# Patient Record
Sex: FEMALE | Race: OTHER | URBAN - METROPOLITAN AREA
[De-identification: names, ages, dates, MRNs, and addresses within clinical notes are randomized per-mention and may not be internally consistent; named-entity substitution may affect disease eponyms.]

---

## 2024-04-08 ENCOUNTER — NURSE ONLY (OUTPATIENT)
Dept: INTERNAL MEDICINE CLINIC | Facility: HOSPITAL | Age: 44
End: 2024-04-08
Attending: EMERGENCY MEDICINE

## 2024-04-08 DIAGNOSIS — Z00.00 WELLNESS EXAMINATION: Primary | ICD-10-CM

## 2024-04-08 LAB
MEV IGG SER-ACNC: 11.7 AU/ML (ref 16.5–?)
MUV IGG SER IA-ACNC: 8.2 AU/ML (ref 11–?)
RUBV IGG SER QL: POSITIVE
RUBV IGG SER-ACNC: 26.7 IU/ML (ref 10–?)
VZV IGG SER IA-ACNC: 1872 (ref 165–?)

## 2024-04-08 PROCEDURE — 86480 TB TEST CELL IMMUN MEASURE: CPT

## 2024-04-08 PROCEDURE — 86765 RUBEOLA ANTIBODY: CPT

## 2024-04-08 PROCEDURE — 86735 MUMPS ANTIBODY: CPT

## 2024-04-08 PROCEDURE — 86787 VARICELLA-ZOSTER ANTIBODY: CPT

## 2024-04-08 PROCEDURE — 86762 RUBELLA ANTIBODY: CPT

## 2024-04-09 LAB
M TB IFN-G CD4+ T-CELLS BLD-ACNC: 0.05 IU/ML
M TB TUBERC IFN-G BLD QL: NEGATIVE
M TB TUBERC IGNF/MITOGEN IGNF CONTROL: >10 IU/ML
QFT TB1 AG MINUS NIL: 0.01 IU/ML
QFT TB2 AG MINUS NIL: 0.06 IU/ML

## 2025-03-24 ENCOUNTER — LAB ENCOUNTER (OUTPATIENT)
Dept: LAB | Age: 45
End: 2025-03-24
Attending: STUDENT IN AN ORGANIZED HEALTH CARE EDUCATION/TRAINING PROGRAM
Payer: COMMERCIAL

## 2025-03-24 ENCOUNTER — PATIENT OUTREACH (OUTPATIENT)
Dept: CASE MANAGEMENT | Age: 45
End: 2025-03-24

## 2025-03-24 ENCOUNTER — OFFICE VISIT (OUTPATIENT)
Dept: INTERNAL MEDICINE CLINIC | Facility: CLINIC | Age: 45
End: 2025-03-24

## 2025-03-24 ENCOUNTER — TELEPHONE (OUTPATIENT)
Dept: INTERNAL MEDICINE CLINIC | Facility: CLINIC | Age: 45
End: 2025-03-24

## 2025-03-24 VITALS
SYSTOLIC BLOOD PRESSURE: 110 MMHG | HEIGHT: 62 IN | WEIGHT: 180 LBS | DIASTOLIC BLOOD PRESSURE: 70 MMHG | HEART RATE: 61 BPM | BODY MASS INDEX: 33.13 KG/M2

## 2025-03-24 DIAGNOSIS — Z13.6 ENCOUNTER FOR SCREENING FOR CORONARY ARTERY DISEASE: ICD-10-CM

## 2025-03-24 DIAGNOSIS — E03.9 ACQUIRED HYPOTHYROIDISM: ICD-10-CM

## 2025-03-24 DIAGNOSIS — Z12.4 CERVICAL CANCER SCREENING: ICD-10-CM

## 2025-03-24 DIAGNOSIS — E55.9 VITAMIN D DEFICIENCY: ICD-10-CM

## 2025-03-24 DIAGNOSIS — Z12.31 ENCOUNTER FOR SCREENING MAMMOGRAM FOR BREAST CANCER: ICD-10-CM

## 2025-03-24 DIAGNOSIS — Z82.49 FAMILY HISTORY OF EARLY CAD: ICD-10-CM

## 2025-03-24 DIAGNOSIS — Z00.00 ANNUAL PHYSICAL EXAM: ICD-10-CM

## 2025-03-24 DIAGNOSIS — Z12.11 COLON CANCER SCREENING: ICD-10-CM

## 2025-03-24 DIAGNOSIS — E55.9 VITAMIN D DEFICIENCY: Primary | ICD-10-CM

## 2025-03-24 DIAGNOSIS — Z00.00 ANNUAL PHYSICAL EXAM: Primary | ICD-10-CM

## 2025-03-24 DIAGNOSIS — J32.4 CHRONIC PANSINUSITIS: ICD-10-CM

## 2025-03-24 LAB
ALBUMIN SERPL-MCNC: 4.2 G/DL (ref 3.2–4.8)
ALBUMIN/GLOB SERPL: 1.6 {RATIO} (ref 1–2)
ALP LIVER SERPL-CCNC: 79 U/L
ALT SERPL-CCNC: 36 U/L
ANION GAP SERPL CALC-SCNC: 7 MMOL/L (ref 0–18)
AST SERPL-CCNC: 28 U/L (ref ?–34)
ATRIAL RATE: 53 BPM
BASOPHILS # BLD AUTO: 0.06 X10(3) UL (ref 0–0.2)
BASOPHILS NFR BLD AUTO: 1 %
BILIRUB SERPL-MCNC: 0.6 MG/DL (ref 0.3–1.2)
BUN BLD-MCNC: 11 MG/DL (ref 9–23)
BUN/CREAT SERPL: 12.1 (ref 10–20)
CALCIUM BLD-MCNC: 9 MG/DL (ref 8.7–10.4)
CHLORIDE SERPL-SCNC: 106 MMOL/L (ref 98–112)
CHOLEST SERPL-MCNC: 158 MG/DL (ref ?–200)
CO2 SERPL-SCNC: 30 MMOL/L (ref 21–32)
CREAT BLD-MCNC: 0.91 MG/DL
DEPRECATED RDW RBC AUTO: 46.8 FL (ref 35.1–46.3)
EGFRCR SERPLBLD CKD-EPI 2021: 79 ML/MIN/1.73M2 (ref 60–?)
EOSINOPHIL # BLD AUTO: 0.36 X10(3) UL (ref 0–0.7)
EOSINOPHIL NFR BLD AUTO: 5.7 %
ERYTHROCYTE [DISTWIDTH] IN BLOOD BY AUTOMATED COUNT: 13.1 % (ref 11–15)
EST. AVERAGE GLUCOSE BLD GHB EST-MCNC: 123 MG/DL (ref 68–126)
FASTING PATIENT LIPID ANSWER: YES
FASTING STATUS PATIENT QL REPORTED: YES
GLOBULIN PLAS-MCNC: 2.6 G/DL (ref 2–3.5)
GLUCOSE BLD-MCNC: 92 MG/DL (ref 70–99)
HBA1C MFR BLD: 5.9 % (ref ?–5.7)
HCT VFR BLD AUTO: 40.5 %
HDLC SERPL-MCNC: 45 MG/DL (ref 40–59)
HGB BLD-MCNC: 13.7 G/DL
IMM GRANULOCYTES # BLD AUTO: 0.02 X10(3) UL (ref 0–1)
IMM GRANULOCYTES NFR BLD: 0.3 %
LDLC SERPL CALC-MCNC: 91 MG/DL (ref ?–100)
LYMPHOCYTES # BLD AUTO: 2.38 X10(3) UL (ref 1–4)
LYMPHOCYTES NFR BLD AUTO: 37.9 %
MCH RBC QN AUTO: 32.9 PG (ref 26–34)
MCHC RBC AUTO-ENTMCNC: 33.8 G/DL (ref 31–37)
MCV RBC AUTO: 97.1 FL
MONOCYTES # BLD AUTO: 0.52 X10(3) UL (ref 0.1–1)
MONOCYTES NFR BLD AUTO: 8.3 %
NEUTROPHILS # BLD AUTO: 2.94 X10 (3) UL (ref 1.5–7.7)
NEUTROPHILS # BLD AUTO: 2.94 X10(3) UL (ref 1.5–7.7)
NEUTROPHILS NFR BLD AUTO: 46.8 %
NONHDLC SERPL-MCNC: 113 MG/DL (ref ?–130)
OSMOLALITY SERPL CALC.SUM OF ELEC: 295 MOSM/KG (ref 275–295)
P AXIS: 21 DEGREES
P-R INTERVAL: 152 MS
PLATELET # BLD AUTO: 287 10(3)UL (ref 150–450)
POTASSIUM SERPL-SCNC: 4.1 MMOL/L (ref 3.5–5.1)
PROT SERPL-MCNC: 6.8 G/DL (ref 5.7–8.2)
Q-T INTERVAL: 452 MS
QRS DURATION: 84 MS
QTC CALCULATION (BEZET): 424 MS
R AXIS: 26 DEGREES
RBC # BLD AUTO: 4.17 X10(6)UL
SODIUM SERPL-SCNC: 143 MMOL/L (ref 136–145)
T AXIS: 12 DEGREES
TRIGL SERPL-MCNC: 122 MG/DL (ref 30–149)
TSI SER-ACNC: 4.19 UIU/ML (ref 0.55–4.78)
VENTRICULAR RATE: 53 BPM
VIT D+METAB SERPL-MCNC: 12.8 NG/ML (ref 30–100)
VLDLC SERPL CALC-MCNC: 20 MG/DL (ref 0–30)
WBC # BLD AUTO: 6.3 X10(3) UL (ref 4–11)

## 2025-03-24 PROCEDURE — 85025 COMPLETE CBC W/AUTO DIFF WBC: CPT

## 2025-03-24 PROCEDURE — 80061 LIPID PANEL: CPT

## 2025-03-24 PROCEDURE — 36415 COLL VENOUS BLD VENIPUNCTURE: CPT

## 2025-03-24 PROCEDURE — 84443 ASSAY THYROID STIM HORMONE: CPT

## 2025-03-24 PROCEDURE — 93010 ELECTROCARDIOGRAM REPORT: CPT | Performed by: INTERNAL MEDICINE

## 2025-03-24 PROCEDURE — 83036 HEMOGLOBIN GLYCOSYLATED A1C: CPT

## 2025-03-24 PROCEDURE — 82306 VITAMIN D 25 HYDROXY: CPT

## 2025-03-24 PROCEDURE — 93005 ELECTROCARDIOGRAM TRACING: CPT

## 2025-03-24 PROCEDURE — 80053 COMPREHEN METABOLIC PANEL: CPT

## 2025-03-24 PROCEDURE — 99386 PREV VISIT NEW AGE 40-64: CPT | Performed by: STUDENT IN AN ORGANIZED HEALTH CARE EDUCATION/TRAINING PROGRAM

## 2025-03-24 RX ORDER — ERGOCALCIFEROL 1.25 MG/1
50000 CAPSULE, LIQUID FILLED ORAL WEEKLY
Qty: 25 CAPSULE | Refills: 0 | Status: SHIPPED | OUTPATIENT
Start: 2025-03-24 | End: 2025-09-20

## 2025-03-24 RX ORDER — AZELASTINE HYDROCHLORIDE 137 UG/1
1-2 SPRAY, METERED NASAL 2 TIMES DAILY
Qty: 30 ML | Refills: 11 | Status: SHIPPED | OUTPATIENT
Start: 2025-03-24

## 2025-03-24 RX ORDER — LEVOTHYROXINE SODIUM 25 UG/1
25 TABLET ORAL
Qty: 90 TABLET | Refills: 3 | Status: SHIPPED | OUTPATIENT
Start: 2025-03-24 | End: 2026-03-19

## 2025-03-24 RX ORDER — FLUTICASONE PROPIONATE 50 MCG
2 SPRAY, SUSPENSION (ML) NASAL DAILY
Qty: 1 EACH | Refills: 11 | Status: SHIPPED | OUTPATIENT
Start: 2025-03-24

## 2025-03-24 RX ORDER — LEVOTHYROXINE SODIUM 25 UG/1
25 TABLET ORAL
COMMUNITY
End: 2025-03-24

## 2025-03-24 NOTE — PROCEDURES
Received order requesting to update Primary Care Physician (PCP) to Dr. David Ramirez is Approved and finalized on March 24, 2025.

## 2025-03-24 NOTE — PROGRESS NOTES
OFFICE NOTE       The following individual(s) verbally consented to be recorded using ambient AI listening technology and understand that they can each withdraw their consent to this listening technology at any point by asking the clinician to turn off or pause the recording:    Patient name: Dasha Montague  Additional names:           Patient ID: Dasha Montague is a 44 year old female.  Today's Date: 03/24/25  Chief Complaint: Establish Care and Physical    Suresh [id number]      History of Present Illness  Dasha Montague is a 44 year old female who presents to [facility] and for an annual physical examination.    She has a long history of hypothyroidism and is currently on Synthroid 25 micrograms daily. She ran out of her medication a few days ago and is concerned about her thyroid levels. She wants to check her TSH level to ensure it is within the goal range.    There is a family history of heart disease, which necessitates a baseline EKG. No first or second degree relatives with colon cancer, although a maternal cousin passed away from colon cancer five years ago.    She has a concern about a nasal issue, describing it as 'a little cut' on her nose that hurts a lot and sometimes forms a crust. She is worried about this persistent nasal discomfort.    No chest pain, palpitations, or shortness of breath.       Vitals:    03/24/25 1020   BP: 110/70   Pulse: 61   Weight: 180 lb (81.6 kg)   Height: 5' 2\" (1.575 m)     body mass index is 32.92 kg/m².  BP Readings from Last 3 Encounters:   03/24/25 110/70     The ASCVD Risk score (Sharonda GREGORY, et al., 2019) failed to calculate for the following reasons:    Cannot find a previous HDL lab    Cannot find a previous total cholesterol lab  Results         Medications reviewed:  Current Outpatient Medications   Medication Sig Dispense Refill    levothyroxine 25 MCG Oral Tab Take 1 tablet (25 mcg total) by mouth before breakfast. 90 tablet 3    azelastine 137  MCG/SPRAY Nasal Solution 1-2 sprays by Nasal route in the morning and 1-2 sprays before bedtime. FOR SINUS SYMPTOMS/NASAL CONGESTION.. 30 mL 11    fluticasone propionate 50 MCG/ACT Nasal Suspension 2 sprays by Each Nare route daily. FOR NASAL CONGESTION/SINUS SYMPTOMS. 1 each 11         Assessment & Plan    1. Annual physical exam (Primary)  -     CT CALCIUM SCORING; Future; Expected date: 03/24/2025  -     EKG 12 Lead; Future; Expected date: 03/24/2025  -     Lipid Panel; Future; Expected date: 03/24/2025  -     TSH W Reflex To Free T4; Future; Expected date: 03/24/2025  -     Hemoglobin A1C; Future; Expected date: 03/24/2025  -     Comp Metabolic Panel (14); Future; Expected date: 03/24/2025  -     CBC With Differential With Platelet; Future; Expected date: 03/24/2025  -     Vitamin D; Future; Expected date: 03/24/2025  -     H PCP or Registry Removal Request  2. Colon cancer screening  -     Gastro Referral - In Network  -     [facility] COLON CANCER SCREENING (EXTERNAL)  3. Acquired hypothyroidism  -     TSH W Reflex To Free T4; Future; Expected date: 03/24/2025  -     Levothyroxine Sodium; Take 1 tablet (25 mcg total) by mouth before breakfast.  Dispense: 90 tablet; Refill: 3  4. Encounter for screening for coronary artery disease  -     CT CALCIUM SCORING; Future; Expected date: 03/24/2025  5. Encounter for screening mammogram for breast cancer  -     [facility]GH 2D+3D SCREENING BILAT (CPT=77067/75622); Future; Expected date: 03/24/2025  6. Family history of early CAD  -     EKG 12 Lead; Future; Expected date: 03/24/2025  7. Vitamin D deficiency  -     Vitamin D; Future; Expected date: 03/24/2025  8. Cervical cancer screening  -     OBG - INTERNAL  9. Chronic pansinusitis  -     ENT - INTERNAL  -     Azelastine HCl; 1-2 sprays by Nasal route in the morning and 1-2 sprays before bedtime. FOR SINUS SYMPTOMS/NASAL CONGESTION..  Dispense: 30 mL; Refill: 11  -     Fluticasone Propionate; 2 sprays by Each Nare route  daily. FOR NASAL CONGESTION/SINUS SYMPTOMS.  Dispense: 1 each; Refill: 11    Assessment & Plan  Nasal Polyp  Lesion on nose suggestive of nasal polyp. Initial treatment with nasal sprays planned. Referral to ENT if symptoms persist.  - Prescribe nasal sprays.  - Refer to ENT specialist Dr. Humphries if symptoms persist.  - Provide instructions for nasal spray use in Azeri.    Hypothyroidism  Chronic hypothyroidism managed with levothyroxine. TSH level to be checked to ensure it is within goal range. Adjust dose if necessary.  - Check TSH level.  - Continue levothyroxine 25 micrograms daily if TSH is within goal.  - Adjust levothyroxine dose if TSH is outside goal range.  - Send prescription to [facility].    General Health Maintenance  Overdue for colorectal cancer screening. Screening mammogram and baseline cardiovascular assessments planned. Cologuard kit for colorectal cancer screening. Calcium CT scan to assess atherosclerotic disease.  - Order Cologuard kit for colorectal cancer screening.  - Order screening mammogram.  - Order calcium CT scan of the heart.  - Order baseline EKG.  - Order baseline lipid panel, TSH, A1c, complete metabolic panel, complete blood count, and vitamin D level.  - Refer to gynecologist Alissa Reina for routine women's health.       Follow Up: As needed/if symptoms worsen or Return in about 1 year (around 3/24/2026) for annual physical ..       Objective/ Results:   Physical Exam  Constitutional:       Appearance: She is well-developed.   HENT:      Head: Normocephalic and atraumatic.      Right Ear: External ear normal.      Left Ear: External ear normal.      Nose: Nose normal.      Right Turbinates: Enlarged and swollen.      Left Turbinates: Enlarged and swollen.   Eyes:      Conjunctiva/sclera: Conjunctivae normal.      Pupils: Pupils are equal, round, and reactive to light.   Cardiovascular:      Rate and Rhythm: Normal rate and regular rhythm.      Heart sounds:  Normal heart sounds.   Pulmonary:      Effort: Pulmonary effort is normal.      Breath sounds: Normal breath sounds.   Abdominal:      General: Bowel sounds are normal.      Palpations: Abdomen is soft.   Genitourinary:     Vagina: Normal.   Musculoskeletal:         General: Normal range of motion.      Cervical back: Normal range of motion and neck supple.   Skin:     General: Skin is warm and dry.   Neurological:      Mental Status: She is alert and oriented to person, place, and time.      Deep Tendon Reflexes: Reflexes are normal and symmetric.   Psychiatric:         Behavior: Behavior normal.         Thought Content: Thought content normal.         Judgment: Judgment normal.        Physical Exam  VITALS: P- 61, BP- 110/70  NECK: Thyroid slightly full, no nodules.  CHEST: Lungs clear to auscultation.  CARDIOVASCULAR: Heart sounds normal.     Reviewed:    There are no active problems to display for this patient.     Allergies[1]     Social History     Socioeconomic History    Marital status:    Tobacco Use    Smoking status: Never     Passive exposure: Never    Smokeless tobacco: Never   Vaping Use    Vaping status: Never Used   Substance and Sexual Activity    Alcohol use: Yes     Comment: social rare events    Drug use: Never      Review of Systems   Constitutional: Negative.    HENT: Negative.     Eyes: Negative.    Respiratory: Negative.     Cardiovascular: Negative.    Gastrointestinal: Negative.    Genitourinary: Negative.    Musculoskeletal: Negative.    Skin: Negative.    Neurological: Negative.    Psychiatric/Behavioral: Negative.         All other systems negative unless otherwise stated in ROS or HPI above.       David Ramirez MD  Internal Medicine       Call office with any questions or seek emergency care if necessary.   Patient understands and agrees to follow directions and advice.      ----------------------------------------- PATIENT INSTRUCTIONS-----------------------------------------      There are no Patient Instructions on file for this visit.        The 21st Century Cures Act makes medical notes available to patients in the interest of transparency.  However, please be advised that this is a medical document.  It is intended as a peer to peer communication.  It is written in medical language and may contain abbreviations or verbiage that are technical and unfamiliar.  It may appear blunt or direct.  Medical documents are intended to carry relevant information, facts as evident, and the clinical opinion of the practitioner.          [1] No Known Allergies

## 2025-03-24 NOTE — TELEPHONE ENCOUNTER
Spoke with pt in office.  Cologuard order and patients demographics faxed to [facility] lab at [phone]

## 2025-03-25 NOTE — PROGRESS NOTES
Please relay to pt (does not have mychart set up):  Hello There!     Dr. Ramirez here, I have reviewed your labs here are your recommendations:    # Lipids/cholesterol: Your ASCVD, ie 10-year risk score which indicates the potential chance that you could have a heart attack, stroke or death related to cholesterol/heart disease, is LOWER than the 5% cut off, thus cholesterol medications, ie statins, are not required to be started at this time, unless you would like to do so to prevent heart disease from starting. Please proceed with the heart scan/calcium score. We will monitor your cholesterols yearly with your annual. Please continue with exercise and mediterranean/low carb diet.      The 10-year ASCVD risk score (Sharonda DK, et al., 2019) is: 0.6%    Values used to calculate the score:      Age: 44 years      Sex: Female      Is Non- : No      Diabetic: No      Tobacco smoker: No      Systolic Blood Pressure: 110 mmHg      Is BP treated: No      HDL Cholesterol: 45 mg/dL      Total Cholesterol: 158 mg/dL    # Diabetes/A1C: Your A1C Last A1c value was 5.9% done 3/24/2025. which shows  prediabetes. This does not require medications unless your A1C reaches greater than 6.5, but if you would like to start medications to prevent the progression to diabetes please let me know. I would recommend increasing exercise and/or reducing your intake of carbohydrates, pastas, sweets, and sugary drinks/etc, drink more water, eat more vegetables instead of fruit, and try to lose 10% of your current bodyweight.  We will recheck your A1C in 3 months, please schedule a follow up office visit so we can recheck your A1C in the office using a finger stick test. You do not need to fast. If you prefer a video visit let me know so we can order a lab draw A1C/metabolic panel to be completed 3 days prior to our visit.     # TSH: Your thyroid (TSH) function is normal.     # CMP: Your comprehensive metabolic panel shows  overall stable functioning kidneys (creatinine, GFR), liver (AST, ALT, Bilirubin), and electrolytes (sodium, potassium, calcium). Slight variations in other values such as BUN/Creat, Serum Osm, anion gap, chloride, etc are not of clinical value at this time.     # CBC: Your complete blood count shows overall stable red blood cells, white blood cells, platelets (help you stop bleeding), and hematocrit (thickness of blood),  Slight variations in other values such as RDW/sw, MCH are not of clinical value at this time.     # Vitamins: Your vitamin D level is Vitamin D Deficiency (<12ng/mL) please start 50,000 International units (one pill weekly) for 6months, I will send a prescription but might be  cheaper to purchase from [employer] or your local pharmacy.   will recheck Vitamin D level 6 months from now, and schedule a follow up in 6 months after checking Vitamin D to see if we need to dose adjust further and if any other medical issues need to be addressed at that time.       If you have any questions or concerns in regards to these labs please schedule a follow up and will gladly discuss.   -Dr. Ramirez

## 2025-03-25 NOTE — PROGRESS NOTES
Please also relay with labs:  Hello, I reviewed your EKG:  shows sinus bradycardia, which is normal in relatively young/healthy patients.

## 2025-04-14 ENCOUNTER — TELEPHONE (OUTPATIENT)
Dept: INTERNAL MEDICINE CLINIC | Facility: CLINIC | Age: 45
End: 2025-04-14

## 2025-04-14 DIAGNOSIS — R19.5 POSITIVE COLORECTAL CANCER SCREENING USING COLOGUARD TEST: Primary | ICD-10-CM

## 2025-04-14 NOTE — TELEPHONE ENCOUNTER
Suzie Exact Khalida indicated that got the results from patient's cologuard screening test on 4/12/2025 and it was positive. She faxed the results to fax [phone].

## 2025-04-14 NOTE — TELEPHONE ENCOUNTER
Advised patient of Dr Ramirez's  note. Patient verbalized understanding and had no further questions. I placed this information in [place] on her my chart. Advised to call Gastroenterology and provided the phone number.

## 2025-04-18 ENCOUNTER — TELEPHONE (OUTPATIENT)
Dept: INTERNAL MEDICINE CLINIC | Facility: CLINIC | Age: 45
End: 2025-04-18

## 2025-04-18 ENCOUNTER — MED REC SCAN ONLY (OUTPATIENT)
Dept: INTERNAL MEDICINE CLINIC | Facility: CLINIC | Age: 45
End: 2025-04-18

## 2025-04-18 NOTE — TELEPHONE ENCOUNTER
Patient called and said the soonest she can get an appointment for gastro is for 5/5. Patient asked if that's okay.

## 2025-04-19 NOTE — TELEPHONE ENCOUNTER
Yes that is fine, please inform can be sure to ask to be put on waitlist if anything sooner opens up

## 2025-04-21 NOTE — TELEPHONE ENCOUNTER
Patient called in and requested an   Called and was connected with [employer]  Christine ID# [id number]      Identified patient's name and date of birth   Relayed Dr. Ramirez  message and patient voiced understanding with treatment plan

## 2025-05-05 ENCOUNTER — OFFICE VISIT (OUTPATIENT)
Facility: CLINIC | Age: 45
End: 2025-05-05
Payer: COMMERCIAL

## 2025-05-05 ENCOUNTER — TELEPHONE (OUTPATIENT)
Facility: CLINIC | Age: 45
End: 2025-05-05

## 2025-05-05 VITALS
HEART RATE: 56 BPM | DIASTOLIC BLOOD PRESSURE: 77 MMHG | SYSTOLIC BLOOD PRESSURE: 119 MMHG | BODY MASS INDEX: 33.13 KG/M2 | HEIGHT: 62 IN | WEIGHT: 180 LBS

## 2025-05-05 DIAGNOSIS — R19.5 POSITIVE FIT (FECAL IMMUNOCHEMICAL TEST): Primary | ICD-10-CM

## 2025-05-05 DIAGNOSIS — K64.9 HEMORRHOIDS, UNSPECIFIED HEMORRHOID TYPE: ICD-10-CM

## 2025-05-05 DIAGNOSIS — K62.5 RECTAL BLEEDING: ICD-10-CM

## 2025-05-05 DIAGNOSIS — K62.5 RECTAL BLEEDING: Primary | ICD-10-CM

## 2025-05-05 DIAGNOSIS — R19.5 POSITIVE FIT (FECAL IMMUNOCHEMICAL TEST): ICD-10-CM

## 2025-05-05 PROCEDURE — 99203 OFFICE O/P NEW LOW 30 MIN: CPT

## 2025-05-05 NOTE — H&P
[facility] - Gastroenterology                                                    Clinic History and Physical     Chief Complaint   Patient presents with    Colonoscopy Screening       Requesting physician or provider: David Ramirez MD    HPI:   Dasha Montague is a 44 year old year-old female with history of hypotyroidism, who presents for colon cancer screening evaluation s/p + FIT. Pt son (Richard) presents to assist with interpretation.    Pt noticed rectal bleeding started about 2-3 months ago. Noticed blood on toilet paper when pt wipes, intermittently. Reports 2 episodes, first episode lasted 3 days and the next only happened once. States that there is a minimal-moderate amount of blood on toilet paper.  Pt aware of hemorrhoids in the past and states that she was straining when rectal bleeding occurred.    Reports 3-4 bm daily, with occasional straining.    Patient denies any GI symptoms of nausea, vomiting, dyspepsia, dysphagia, hematemesis, abdominal pain, change in bowel habits, thin stools,  or melena.  Additionally there is no weight loss and no reported history of chest pain or shortness of breath.  -denies family history of colon cancer.  -denies significant constipation issues.    Prior endoscopies:  denies    Soc:  -No smoking  -denies Etoh    History, Medications, Allergies, ROS:      Past Medical History[1]   Past Surgical History[2]   Family Hx: Family History[3]   Social History: Short Social Hx on File[4]     Medications (Active prior to today's visit):  Current Medications[5]    Allergies:  Allergies[6]    ROS:   CONSTITUTIONAL:  negative for fevers, rigors  EYES:  negative for diplopia   RESPIRATORY:  negative for severe shortness of breath  CARDIOVASCULAR:  negative for crushing sub-sternal chest pain  GASTROINTESTINAL:  see HPI  GENITOURINARY:  negative for dysuria or gross hematuria  INTEGUMENT/BREAST:  SKIN:  negative for jaundice    ALLERGIC/IMMUNOLOGIC:  negative for hay fever  ENDOCRINE:  negative for cold intolerance and heat intolerance  MUSCULOSKELETAL:  negative for joint effusion/severe erythema  BEHAVIOR/PSYCH:  negative for psychotic behavior      PHYSICAL EXAM:   Blood pressure 119/77, pulse 56, height 5' 2\" (1.575 m), weight 180 lb (81.6 kg).    Gen- Patient appears comfortable and in no acute discomfort  HEENT: the sclera appears anicteric, oropharynx clear, mucus membranes appear moist  CV- regular rate and rhythm, the extremities are warm and well perfused   Lung- Moves air well; No labored breathing  Abdomen- soft, non-tender exam in all quadrants without rigidity or guarding, non-distended, no abnormal bowel sounds noted, no masses are palpated  Skin- No jaundice  Ext: no cyanosis, clubbing or edema is evident.   Neuro- Alert and interactive, and gross movements of extremities normal    Labs/Imaging:     Patient's labs and imaging were reviewed and discussed with patient today.      .  ASSESSMENT/PLAN:   Dasha Montague is a 44 year old year-old female with history of hypotyroidism, who presents for colon cancer screening evaluation s/p + FIT. Pt son (Richard) presents to assist with interpretation.    Pt noticed rectal bleeding started about 2-3 months ago. Noticed blood on toilet paper when pt wipes, intermittently. Reports 2 episodes, first episode lasted 3 days and the next only happened once. States that there is a minimal-moderate amount of blood on toilet paper.  Pt aware of hemorrhoids in the past and states that she was straining when rectal bleeding occurred.    Reports 3-4 bm daily, with occasional straining.    Patient denies any GI symptoms of nausea, vomiting, dyspepsia, dysphagia, hematemesis, abdominal pain, change in bowel habits, thin stools,  or melena.  Additionally there is no weight loss and no reported history of chest pain or shortness of breath.      1. Rectal bleeding    2. Positive FIT (fecal  immunochemical test)    3. Hemorrhoids, unspecified hemorrhoid type    # hemorrhoids/rectal bleeding  - avoid straining  - sitz baths  - use tucks pads to wipe    Recommend:  -Schedule colonoscopy   -Prep: Trilyte Prep         Colonoscopy consent: I have discussed the risks, benefits, and alternatives to colonoscopy with the patient [who demonstrated understanding], including but not limited to the risks of bleeding, infection, pain, death, as well as the risks of anesthesia and perforation all leading to prolonged hospitalization, surgical intervention, or even death. I also specifically mentioned the miss rate of colonoscopy of 5-10% in the best of all circumstances. All questions were answered to the patient’s satisfaction. The patient signed informed consent and elected to proceed with colonoscopy with intervention [i.e. polypectomy, stent placement, etc.] as indicated.      Orders This Visit:  No orders of the defined types were placed in this encounter.      Meds This Visit:  Requested Prescriptions      No prescriptions requested or ordered in this encounter       Imaging & Referrals:  None         INES Becker   5/5/2025          [1] History reviewed. No pertinent past medical history.  [2] History reviewed. No pertinent surgical history.  [3]   Family History  Problem Relation Age of Onset    Diabetes Mother     Diabetes Maternal Grandmother     Diabetes Maternal Grandfather    [4]   Social History  Socioeconomic History    Marital status:    Tobacco Use    Smoking status: Never     Passive exposure: Never    Smokeless tobacco: Never   Vaping Use    Vaping status: Never Used   Substance and Sexual Activity    Alcohol use: Yes     Comment: social rare events    Drug use: Never   [5]   Current Outpatient Medications   Medication Sig Dispense Refill    levothyroxine 25 MCG Oral Tab Take 1 tablet (25 mcg total) by mouth before breakfast. 90 tablet 3    azelastine 137 MCG/SPRAY Nasal Solution 1-2  sprays by Nasal route in the morning and 1-2 sprays before bedtime. FOR SINUS SYMPTOMS/NASAL CONGESTION.. 30 mL 11    fluticasone propionate 50 MCG/ACT Nasal Suspension 2 sprays by Each Nare route daily. FOR NASAL CONGESTION/SINUS SYMPTOMS. 1 each 11    ergocalciferol 1.25 MG (39104 UT) Oral Cap Take 1 capsule (50,000 Units total) by mouth once a week. 25 capsule 0   [6] No Known Allergies

## 2025-05-05 NOTE — PATIENT INSTRUCTIONS
# hemorrhoids/rectal bleeding  - avoid straining  - sitz baths  - use tucks pads to wipe      1. Schedule colonoscopy with [facility] w/ URGENT pool MD [Diagnosis: rectal bleeding, +FIT]    2.  bowel prep from pharmacy: Prep: Trilyte Prep    For cardiology patients and patients on blood thinners:  Please contact your cardiology clinic for clearance to proceed with the endoscopic procedure. If you are on blood thinners, please also confirm with your cardiologic clinic that you are able to hold the blood thinner per our recommendations.\"    BLOOD THINNER ORDERS:  -Hold for 48 hours (Xarelto, Eliquis, Pradaxa, Savaysa)  -Hold for 3 days (Pletal)  -Hold for 5 days (Coumadin, Plavix, Brilinta, Aggrenox)  -Hold for 7 days (Effient)     For endocrinology insulin patients:    Please contact your endocrinology clinic for insulin adjustment orders prior to your endoscopic procedure.    4. Read all bowel prep instructions carefully    5. AVOID seeds, nuts, popcorn, raw fruits and vegetables (cooked is okay) for 5 days before procedure    6.   If you start any NEW medication after your visit today, please notify us. Certain medications will need to be held before the procedure, or the procedure cannot be performed.     >>>Please note: if you were prescribed Suprep for the bowel prep and it is too expensive or not covered by insurance, it is okay to substitute Trilyte (or any similar generic prep). This can be done by notifying the pharmacy or calling our office.     ENDOSCOPIC RISK BENEFIT DISCUSSION: I described the procedure in great detail with the patient. I discussed the risks and benefits, including but not limited to: bleeding, perforation, infection, anesthesia complications, and even death. Patient will be NPO after midnight and will have a person physically present at time of pick-up to drive patient home. Patient verbalized understanding and agrees to proceed with procedure as planned.

## 2025-05-05 NOTE — TELEPHONE ENCOUNTER
Scheduled for:  Colonoscopy 51710  Provider Name: Dr. Lemon   Date:  06/02/02025  Location:[facility]  Sedation:  [facility]  Time:  (Patient is aware that endo/eosc will call with arrival time )  Prep:  Trilyte Prep Instructions Given At The Office Visit  Meds/Allergies Reconciled?: INES Becker Reviewed   Diagnosis with codes:  + FIT R19.5/ Rectal bleeding K62.5  Was patient informed to call insurance with codes (Y/N):  Yes  Referral sent?:  Referral was sent at the time of electronic surgical scheduling.  EM or [facility] notified?:  I sent an electronic request to Endo Scheduling and received a confirmation today.  Medication Orders:  Pt is aware to NOT take iron pills, herbal meds and diet supplements for 7 days before exam. Also to NOT take any form of alcohol, recreational drugs and any forms of ED meds 24 hours before exam.   Misc Orders:       Further instructions given by staff:  I provide prep instructions to patient at the time of the appointment and reviewed date, and location, she verbalized that she understood and is aware to call if she has any questions.

## 2025-06-02 NOTE — DISCHARGE INSTRUCTIONS
Home Care Instructions for Colonoscopy  Diet:  - Resume your regular diet as tolerated unless otherwise instructed.  - Start with light meals to minimize bloating.  - Do not drink alcohol today.    Medication:  - If you have questions about resuming your normal medications, please contact your Primary Care Physician.    Activities:  - Take it easy today. Do not return to work today.  - Do not drive today.  - Do not operate any machinery today (including kitchen equipment).    Colonoscopy:  - You may notice some rectal \"spotting\" (a little blood on the toilet tissue) for a day or two after the exam. This is normal.  - If you experience any rectal bleeding (not spotting), persistent tenderness or sharp severe abdominal pains, oral temperature over 100 degrees Fahrenheit, light-headedness or dizziness, or any other problems, contact your doctor.      **If unable to reach your doctor, please go to the [facility] Emergency Room**    - Your referring physician will receive a full report of your examination.  - If you do not hear from your doctor's office within two weeks of your biopsy, please call them for your results.    You may be able to see your laboratory results in [employer]pad between 4 and 7 business days.  In some cases, your physician may not have viewed the results before they are released to Showpad.  If you have questions regarding your results contact the physician who ordered the test/exam by phone or via Showpad by choosing \"Ask a Medical Question.\"

## 2025-06-02 NOTE — OPERATIVE REPORT
COLONOSCOPY REPORT    Dasha Bansal     1980 Age 44 year old   PCP David Ramirez MD Endoscopist Heather Lemon MD       Date of procedure: 25    Procedure: Colonoscopy w/ cold and hot snare polypectomy, clip placement x2, tattoo placement     Pre-operative diagnosis: + cologuard, rectal bleeding     Post-operative diagnosis: colon polyps, diverticulosis, hemorrhoids     Medications: MAC    Withdrawal time: 39 minutes    Procedure:  Informed consent was obtained from the patient after the risks of the procedure were discussed, including but not limited to bleeding, perforation, aspiration, infection, or possibility of a missed lesion. After discussions of the risks/benefits and alternatives to this procedure, as well as the planned sedation, the patient was placed in the left lateral decubitus position and begun on continuous blood pressure pulse oximetry and EKG monitoring and this was maintained throughout the procedure. Once an adequate level of sedation was obtained a digital rectal exam was completed. Then the lubricated tip of the [id number] diagnostic video colonoscope was inserted and advanced without difficulty to the cecum using water immersion and CO2 insufflation technique. The cecum was identified by localizing the trifold, the appendix and the ileocecal valve. Withdrawal was begun with thorough washing and careful examination of the colonic walls and folds. A routine second examination of the cecum/ascending colon was performed. Photodocumentation was obtained. The bowel prep was excellent. Views of the colon were excellent with washing. I then carefully withdrew the instrument from the patient who tolerated the procedure well.     Complications: none.    Findings:   1. Ten polyps noted as follows:      A. THREE -- 6-12 mm polyps in the cecum of the colon; sessile and semi-pedunculated morphology; cold and hot snare polypectomy performed, polyps retrieved. One clip  placed for hemostasis.       B. FIVE -- 3-12 mm polyps in the ascending colon; sessile morphology; cold and hot snare polypectomy performed, polyps retrieved.      C. In addition to above, another 15-20 mm polyp in the ascending colon; semi-pedunculated morphology with superficial ulceration;  hot snare polypectomy performed, polyp retrieved. A 2.5 mL spot dye tattoo was placed 5 cm distal to the site for marking.       D. 12 mm polyp in the rectum; pedunculated morphology; hot snare polypectomy performed, polyp retrieved. One clip placed to prevent post-polypectomy bleeding.     2. Diverticulosis: right and left-sided.    3. Ileocecal valve appeared healthy and normal.    4. The colonic mucosa throughout the colon showed normal vascular pattern, without evidence of angioectasias or inflammation.     5. A retroflexed view of the rectum revealed small internal hemorrhoids.    6. ABRAHAM: normal rectal tone, no masses palpated.     Impression:   Ten polyps removed. Several polyps were > 1 cm in size. One polyp had superficial ulceration and the site was marked with a tattoo. Two clips placed.   Pan-diverticulosis.   Small internal hemorrhoids.   The colon was otherwise normal with glistening mucosa and intact vascular pattern.    Recommend:  Await pathology. The interval for the next colonoscopy will be determined after reviewing pathology. If new signs or symptoms develop, colonoscopy may need to be repeated sooner.   High fiber diet.  Monitor for blood in the stool. If having more than just tinge of blood, call office or go to the ER.  Avoid NSAIDs (motrin, ibuprofen, aleve, advil, naproxen, midol, naprosyn, excedrin) for 14 days.     >>>If tissue was obtained and you have not received your pathology results either by phone or letter within 2 weeks, please call our office at [phone].    Specimens: colon polyps     Blood loss: <1 ml

## 2025-06-02 NOTE — H&P
History & Physical Examination    Patient Name: Dasha Bansal  MRN: [id number]  [facility]: [id number]  YOB: 1980    Diagnosis: rectal bleeding, + cologuard      Prescriptions Prior to Admission[1]  Current Hospital Medications[2]    Allergies: Allergies[3]    Past Medical History[4]  Past Surgical History[5]  Family History[6]  Social History     Tobacco Use    Smoking status: Every Day     Types: Cigarettes     Passive exposure: Never    Smokeless tobacco: Never    Tobacco comments:     One cigarette daily   Substance Use Topics    Alcohol use: Not Currently     Comment: social rare events       SYSTEM Check if Review is Normal Check if Physical Exam is Normal If not normal, please explain:   HEENT [X ] [ X]    NECK  [X ] [ X]    HEART [X ] [ X]    LUNGS [X ] [ X]    ABDOMEN [X ] [ X]    EXTREMITIES [X ] [ X]    OTHER        I have discussed the risks and benefits and alternatives of the procedure with the patient/family.  They understand and agree to proceed with plan of care.   I have reviewed the History and Physical done within the last 30 days.  Any changes noted above.    Hetaher Lemon MD                 [1]   Medications Prior to Admission   Medication Sig Dispense Refill Last Dose/Taking    aspirin-acetaminophen-caffeine 250-250-65 MG Oral Tab Take 1 tablet by mouth every 6 (six) hours as needed for Pain.   Taking As Needed    levothyroxine 25 MCG Oral Tab Take 1 tablet (25 mcg total) by mouth before breakfast. 90 tablet 3 Taking    ergocalciferol 1.25 MG (70064 UT) Oral Cap Take 1 capsule (50,000 Units total) by mouth once a week. 25 capsule 0 Taking    [] polyethylene glycol, PEG 3350-KCl-NaBcb-NaCl-NaSulf, 236 g Oral Recon Soln Take 4,000 mL by mouth once for 1 dose. As directed by GI clinic instructions. (Patient not taking: Reported on 2025) 4000 mL 0 Not Taking    azelastine 137 MCG/SPRAY Nasal Solution 1-2 sprays by Nasal route in the morning and 1-2 sprays  before bedtime. FOR SINUS SYMPTOMS/NASAL CONGESTION.. (Patient not taking: Reported on 2025) 30 mL 11 Not Taking    fluticasone propionate 50 MCG/ACT Nasal Suspension 2 sprays by Each Nare route daily. FOR NASAL CONGESTION/SINUS SYMPTOMS. (Patient not taking: Reported on 2025) 1 each 11 Not Taking   [2]   Current Facility-Administered Medications   Medication Dose Route Frequency    lactated ringers infusion   Intravenous Continuous   [3] No Known Allergies  [4]   Past Medical History:   Migraines    Visual impairment    reading glasses   [5]   Past Surgical History:  Procedure Laterality Date         [6]   Family History  Problem Relation Age of Onset    Diabetes Mother     Diabetes Maternal Grandmother     Diabetes Maternal Grandfather

## 2025-06-02 NOTE — ANESTHESIA PREPROCEDURE EVALUATION
Anesthesia PreOp Note    HPI:     Dasha Bansal is a 44 year old female who presents for preoperative consultation requested by: Heather Lemon MD    Date of Surgery: 6/2/2025    Procedure(s):  COLONOSCOPY  Indication: Positive FIT (fecal immunochemical test)/Rectal bleeding    Relevant Problems   No relevant active problems       NPO:  Last Liquid Consumption Date: 06/02/25  Last Liquid Consumption Time: 0900  Last Solid Consumption Date: 06/01/25  Last Solid Consumption Time: 1500  Last Liquid Consumption Date: 06/02/25          History Review:  There are no active problems to display for this patient.      Past Medical History[1]    Past Surgical History[2]    Prescriptions Prior to Admission[3]  Current Medications and Prescriptions Ordered in Epic[4]    Allergies[5]    Family History[6]  Social Hx on file[7]    Available pre-op labs reviewed.  Lab Results   Component Value Date    WBC 6.3 03/24/2025    RBC 4.17 03/24/2025    HGB 13.7 03/24/2025    HCT 40.5 03/24/2025    MCV 97.1 03/24/2025    MCH 32.9 03/24/2025    MCHC 33.8 03/24/2025    RDW 13.1 03/24/2025    .0 03/24/2025     Lab Results   Component Value Date     03/24/2025    K 4.1 03/24/2025     03/24/2025    CO2 30.0 03/24/2025    BUN 11 03/24/2025    CREATSERUM 0.91 03/24/2025    GLU 92 03/24/2025    CA 9.0 03/24/2025          Vital Signs:  Body mass index is 32.92 kg/m².   height is 1.575 m (5' 2\") and weight is 81.6 kg (180 lb).   Vitals:    05/23/25 1742   Weight: 81.6 kg (180 lb)   Height: 1.575 m (5' 2\")        Anesthesia Evaluation     Patient summary reviewed    No history of anesthetic complications   Airway   Mallampati: II  TM distance: >3 FB  Neck ROM: full  Dental - Dentition appears grossly intact     Pulmonary - negative ROS and normal exam   Cardiovascular - negative ROS and normal exam    Neuro/Psych - negative ROS     GI/Hepatic/Renal - negative ROS     Endo/Other - negative ROS   Abdominal                   Anesthesia Plan:   ASA:  2  Plan:   MAC  Informed Consent Plan and Risks Discussed With:  Patient      I have informed Dasha Bansal and/or legal guardian or family member of the nature of the anesthetic plan, benefits, risks including possible dental damage if relevant, major complications, and any alternative forms of anesthetic management.   All of the patient's questions were answered to the best of my ability. The patient desires the anesthetic management as planned.  Greg DuttonPHILL  2025 1:20 PM  Present on Admission:  **None**           [1]   Past Medical History:   Migraines    Visual impairment    reading glasses   [2]   Past Surgical History:  Procedure Laterality Date         [3]   Medications Prior to Admission   Medication Sig Dispense Refill Last Dose/Taking    aspirin-acetaminophen-caffeine 250-250-65 MG Oral Tab Take 1 tablet by mouth every 6 (six) hours as needed for Pain.   Taking As Needed    levothyroxine 25 MCG Oral Tab Take 1 tablet (25 mcg total) by mouth before breakfast. 90 tablet 3 2025 Morning    ergocalciferol 1.25 MG (63918 UT) Oral Cap Take 1 capsule (50,000 Units total) by mouth once a week. 25 capsule 0 Taking    [] polyethylene glycol, PEG 3350-KCl-NaBcb-NaCl-NaSulf, 236 g Oral Recon Soln Take 4,000 mL by mouth once for 1 dose. As directed by GI clinic instructions. (Patient not taking: Reported on 2025) 4000 mL 0 Not Taking    azelastine 137 MCG/SPRAY Nasal Solution 1-2 sprays by Nasal route in the morning and 1-2 sprays before bedtime. FOR SINUS SYMPTOMS/NASAL CONGESTION.. (Patient not taking: Reported on 2025) 30 mL 11 Not Taking    fluticasone propionate 50 MCG/ACT Nasal Suspension 2 sprays by Each Nare route daily. FOR NASAL CONGESTION/SINUS SYMPTOMS. (Patient not taking: Reported on 2025) 1 each 11 Not Taking   [4]   Current Facility-Administered Medications Ordered in Epic   Medication Dose Route Frequency  Provider Last Rate Last Admin    lactated ringers infusion   Intravenous Continuous Heather Lemon MD         No current [facility]-ordered outpatient medications on file.   [5] No Known Allergies  [6]   Family History  Problem Relation Age of Onset    Diabetes Mother     Diabetes Maternal Grandmother     Diabetes Maternal Grandfather    [7]   Social History  Socioeconomic History    Marital status:    Tobacco Use    Smoking status: Every Day     Types: Cigarettes     Passive exposure: Never    Smokeless tobacco: Never    Tobacco comments:     One cigarette daily   Vaping Use    Vaping status: Never Used   Substance and Sexual Activity    Alcohol use: Not Currently     Comment: social rare events    Drug use: Never

## 2025-06-02 NOTE — ANESTHESIA POSTPROCEDURE EVALUATION
Patient: Dasha Bansal    Procedure Summary       Date: 06/02/25 Room / Location: [facility] ENDOSCOPY 03 / EM ENDOSCOPY    Anesthesia Start: 1340 Anesthesia Stop: 1434    Procedure: COLONOSCOPY Diagnosis:       Positive FIT (fecal immunochemical test)      Rectal bleeding      (Polyps, Hemorrhoids, Diverticulosis)    Surgeons: Heather Lemon MD Anesthesiologist: Greg Dutton CRNA    Anesthesia Type: MAC ASA Status: 2            Anesthesia Type: MAC    Vitals Value Taken Time   /68 06/02/25 14:34   Temp 36 06/02/25 14:34   Pulse 70 06/02/25 14:34   Resp 17 06/02/25 14:34   SpO2 97 06/02/25 14:34       [facility] AN Post Evaluation:   Patient Evaluated in PACU  Patient Participation: complete - patient participated  Level of Consciousness: awake and awake and alert  Pain Score: 0  Pain Management: satisfactory to patient  Airway Patency:patent  Yes    Cardiovascular Status: acceptable  Respiratory Status: acceptable  Postoperative Hydration acceptable      Greg Dutton CRNA  6/2/2025 2:34 PM

## 2025-06-03 NOTE — PROGRESS NOTES
Reviewed results with pt via telephone with . Recommend repeat colonoscopy in 1 year.     GI Staff:    Can you please place a recall to have this patient repeat a colonoscopy in 1 year.    Thank you,  Heather

## 2025-06-04 NOTE — TELEPHONE ENCOUNTER
Recall colonoscopy in 1 year per Dr. Lemon.     Last done 6/2/2025.     Recall entered into patient outreach for  6/2/2026.     Health maintenance updated.

## 2025-06-04 NOTE — TELEPHONE ENCOUNTER
----- Message from Heather Lemon sent at 6/3/2025  4:28 PM CDT -----  Reviewed results with pt via telephone with . Recommend repeat colonoscopy in 1 year.     GI Staff:    Can you please place a recall to have this patient repeat a colonoscopy in 1 year.    Thank you,  Heather      ----- Message -----  From: Lab, Background User  Sent: 6/3/2025  12:56 PM CDT  To: Heather Lemon MD

## 2025-06-21 NOTE — TELEPHONE ENCOUNTER
Violet Bansal    This is Dr. Ramirez  I am reaching out in regards to remind you that  you are due for Annual Mammogram, Please schedule follow up/establish visit and will order if not already done     Please complete this within the next month as I value providing high value evidence based medical care and prevention and would like to go over the next steps needed In your wellness endeavor.     However if you have established care with another provider please call our office [phone] or send us a [employer] message and we will remove your name from our list to indicate you have a new provider and will gladly send records to them if requested.  However if you have HMO insurance, you will need to call them to remove him from your list and pick any in-network provider.     Wish you all the best and stay healthy  -Dr. James BROWNE

## (undated) NOTE — LETTER
[facility]  [address], [place], [place]    Authorization for Surgical Operation and Procedure                               I hereby authorize Heather Lemon MD, my physician and his/her assistants (if applicable), which may include medical students, residents, and/or fellows, to perform the following surgical operation/ procedure and administer such anesthesia as may be determined necessary by my physician: Operation/Procedure name (s) COLONOSCOPY on Dasha Montague   2.   I recognize that during the surgical operation/procedure, unforeseen conditions may necessitate additional or different procedures than those listed above.  I, therefore, further authorize and request that the above-named surgeon, assistants, or designees perform such procedures as are, in their judgment, necessary and desirable.    3.   My surgeon/physician has discussed prior to my surgery the potential benefits, risks and side effects of this procedure; the likelihood of achieving goals; and potential problems that might occur during recuperation.  They also discussed reasonable alternatives to the procedure, including risks, benefits, and side effects related to the alternatives and risks related to not receiving this procedure.  I have had all my questions answered and I acknowledge that no guarantee has been made as to the result that may be obtained.    4.   Should the need arise during my operation/procedure, which includes change of level of care prior to discharge, I also consent to the administration of blood and/or blood products.  Further, I understand that despite careful testing and screening of blood or blood products by collecting agencies, I may still be subject to ill effects as a result of receiving a blood transfusion and/or blood products.  The following are some, but not all, of the potential risks that can occur: fever and allergic reactions, hemolytic reactions, transmission of  diseases such as Hepatitis, AIDS and Cytomegalovirus (CMV) and fluid overload.  In the event that I wish to have an autologous transfusion of my own blood, or a directed donor transfusion, I will discuss this with my physician.  Check only if Refusing Blood or Blood Products  I understand refusal of blood or blood products as deemed necessary by my physician may have serious consequences to my condition to include possible death. I hereby assume responsibility for my refusal and release the hospital, its personnel, and my physicians from any responsibility for the consequences of my refusal.    o  Refuse   5.   I authorize the use of any specimen, organs, tissues, body parts or foreign objects that may be removed from my body during the operation/procedure for diagnosis, research or teaching purposes and their subsequent disposal by hospital authorities.  I also authorize the release of specimen test results and/or written reports to my treating physician on the hospital medical staff or other referring or consulting physicians involved in my care, at the discretion of the Pathologist or my treating physician.    6.   I consent to the photographing or videotaping of the operations or procedures to be performed, including appropriate portions of my body for medical, scientific, or educational purposes, provided my identity is not revealed by the pictures or by descriptive texts accompanying them.  If the procedure has been photographed/videotaped, the surgeon will obtain the original picture, image, videotape or CD.  The hospital will not be responsible for storage, release or maintenance of the picture, image, tape or CD.    7.   I consent to the presence of a  or observers in the operating room as deemed necessary by my physician or their designees.    8.   I recognize that in the event my procedure results in extended X-Ray/fluoroscopy time, I may develop a skin reaction.    9. If I have a Do Not  Attempt Resuscitation (DNAR) order in place, that status will be suspended while in the operating room, procedural suite, and during the recovery period unless otherwise explicitly stated by me (or a person authorized to consent on my behalf). The surgeon or my attending physician will determine when the applicable recovery period ends for purposes of reinstating the DNAR order.  10. Patients having a sterilization procedure: I understand that if the procedure is successful the results will be permanent and it will therefore be impossible for me to inseminate, conceive, or bear children.  I also understand that the procedure is intended to result in sterility, although the result has not been guaranteed.   11. I acknowledge that my physician has explained sedation/analgesia administration to me including the risk and benefits I consent to the administration of sedation/analgesia as may be necessary or desirable in the judgment of my physician.    I CERTIFY THAT I HAVE READ AND FULLY UNDERSTAND THE ABOVE CONSENT TO OPERATION and/or OTHER PROCEDURE.     ____________________________________  _________________________________        ______________________________  Signature of Patient    Signature of Responsible Person                Printed Name of Responsible Person                                      ____________________________________  _____________________________                ________________________________  Signature of Witness        Date  Time         Relationship to Patient    STATEMENT OF PHYSICIAN My signature below affirms that prior to the time of the procedure; I have explained to the patient and/or his/her legal representative, the risks and benefits involved in the proposed treatment and any reasonable alternative to the proposed treatment. I have also explained the risks and benefits involved in refusal of the proposed treatment and alternatives to the proposed treatment and have answered the  patient's questions. If I have a significant financial interest in a co-management agreement or a significant financial interest in any product or implant, or other significant relationship used in this procedure/surgery, I have disclosed this and had a discussion with my patient.     _____________________________________________________              _____________________________  (Signature of Physician)                                                                                         (Date)                                   (Time)  Patient Name: Dasha Montague      : 1980      Printed: 2025     Medical Record #: [id number]                                      Page 1 of 1

## (undated) NOTE — LETTER
[facility]  Administration of Anesthesia  IDasha agree to be cared for by a physician anesthesiologist alone and/or with a nurse anesthetist, who is specially trained to monitor me and give me medicine to put me to sleep or keep me comfortable during my procedure    I understand that my anesthesiologist and/or anesthetist is not an employee or agent of [facility] or [employer]. He or she works for [facility]    As the patient asking for anesthesia services, I agree to:  Allow the anesthesiologist (anesthesia doctor) to give me medicine and do additional procedures as necessary. Some examples are: Starting or using an “IV” to give me medicine, fluids or blood during my procedure, and having a breathing tube placed to help me breathe when I’m asleep (intubation). In the event that my heart stops working properly, I understand that my anesthesiologist will make every effort to sustain my life, unless otherwise directed by [facility] Do Not Resuscitate documents.  Tell my anesthesia doctor before my procedure:  If I am pregnant.  The last time that I ate or drank.  iii. All of the medicines I take (including prescriptions, herbal supplements, and pills I can buy without a prescription (including street drugs/illegal medications). Failure to inform my anesthesiologist about these medicines may increase my risk of anesthetic complications.  iv.If I am allergic to anything or have had a reaction to anesthesia before.  I understand how the anesthesia medicine will help me (benefits).  I understand that with any type of anesthesia medicine there are risks:  The most common risks are: nausea, vomiting, sore throat, muscle soreness, damage to my eyes, mouth, or teeth (from breathing tube placement).  Rare risks include: remembering what happened during my procedure, allergic reactions to medications, injury to my airway, heart, lungs, vision, nerves, or  muscles and in extremely rare instances death.  My doctor has explained to me other choices available to me for my care (alternatives).  Pregnant Patients (“epidural”):  I understand that the risks of having an epidural (medicine given into my back to help control pain during labor), include itching, low blood pressure, difficulty urinating, headache or slowing of the baby’s heart. Very rare risks include infection, bleeding, seizure, irregular heart rhythms and nerve injury.  Regional Anesthesia (“spinal”, “epidural”, & “nerve blocks”):  I understand that rare but potential complications include headache, bleeding, infection, seizure, irregular heart rhythms, and nerve injury.    _____________________________________________________________________________  Patient (or Representative) Signature/Relationship to Patient  Date   Time    _____________________________________________________________________________   Name (if used)    Language/Organization   Time    _____________________________________________________________________________  Nurse Anesthetist Signature     Date   Time  _____________________________________________________________________________  Anesthesiologist Signature     Date   Time  I have discussed the procedure and information above with the patient (or patient’s representative) and answered their questions. The patient or their representative has agreed to have anesthesia services.    _____________________________________________________________________________  Witness        Date   Time  I have verified that the signature is that of the patient or patient’s representative, and that it was signed before the procedure  Patient Name: Dasha Montague     : 1980                 Printed: 2025 at 8:44 AM    Medical Record #: [id number]                                            Page 1 of 1  ----------ANESTHESIA CONSENT----------